# Patient Record
Sex: MALE
[De-identification: names, ages, dates, MRNs, and addresses within clinical notes are randomized per-mention and may not be internally consistent; named-entity substitution may affect disease eponyms.]

---

## 2023-02-08 PROBLEM — Z00.00 ENCOUNTER FOR PREVENTIVE HEALTH EXAMINATION: Status: ACTIVE | Noted: 2023-02-08

## 2023-02-09 ENCOUNTER — LABORATORY RESULT (OUTPATIENT)
Age: 68
End: 2023-02-09

## 2023-02-09 ENCOUNTER — APPOINTMENT (OUTPATIENT)
Dept: PULMONOLOGY | Facility: CLINIC | Age: 68
End: 2023-02-09
Payer: COMMERCIAL

## 2023-02-09 VITALS
HEIGHT: 65 IN | HEART RATE: 102 BPM | TEMPERATURE: 97.2 F | OXYGEN SATURATION: 97 % | WEIGHT: 168 LBS | BODY MASS INDEX: 27.99 KG/M2 | SYSTOLIC BLOOD PRESSURE: 124 MMHG | DIASTOLIC BLOOD PRESSURE: 80 MMHG

## 2023-02-09 DIAGNOSIS — J45.909 UNSPECIFIED ASTHMA, UNCOMPLICATED: ICD-10-CM

## 2023-02-09 DIAGNOSIS — R73.03 PREDIABETES.: ICD-10-CM

## 2023-02-09 PROCEDURE — 99204 OFFICE O/P NEW MOD 45 MIN: CPT | Mod: 25

## 2023-02-09 PROCEDURE — 71046 X-RAY EXAM CHEST 2 VIEWS: CPT

## 2023-02-09 PROCEDURE — 94727 GAS DIL/WSHOT DETER LNG VOL: CPT

## 2023-02-09 PROCEDURE — 95012 NITRIC OXIDE EXP GAS DETER: CPT

## 2023-02-09 PROCEDURE — 94060 EVALUATION OF WHEEZING: CPT

## 2023-02-09 PROCEDURE — 94729 DIFFUSING CAPACITY: CPT

## 2023-02-09 RX ORDER — ROSUVASTATIN CALCIUM 5 MG/1
TABLET, FILM COATED ORAL
Refills: 0 | Status: ACTIVE | COMMUNITY

## 2023-02-10 LAB
BASOPHILS # BLD AUTO: 0.06 K/UL
BASOPHILS NFR BLD AUTO: 0.7 %
EOSINOPHIL # BLD AUTO: 0.16 K/UL
EOSINOPHIL NFR BLD AUTO: 1.8 %
HCT VFR BLD CALC: 51.8 %
HGB BLD-MCNC: 16.9 G/DL
IMM GRANULOCYTES NFR BLD AUTO: 0.2 %
LYMPHOCYTES # BLD AUTO: 2.97 K/UL
LYMPHOCYTES NFR BLD AUTO: 33 %
MAN DIFF?: NORMAL
MCHC RBC-ENTMCNC: 28.2 PG
MCHC RBC-ENTMCNC: 32.6 GM/DL
MCV RBC AUTO: 86.3 FL
MONOCYTES # BLD AUTO: 0.63 K/UL
MONOCYTES NFR BLD AUTO: 7 %
NEUTROPHILS # BLD AUTO: 5.15 K/UL
NEUTROPHILS NFR BLD AUTO: 57.3 %
PLATELET # BLD AUTO: 274 K/UL
RBC # BLD: 6 M/UL
RBC # FLD: 14.2 %
WBC # FLD AUTO: 8.99 K/UL

## 2023-02-13 LAB
A ALTERNATA IGE QN: <0.1 KUA/L
A FUMIGATUS IGE QN: <0.1 KUA/L
C ALBICANS IGE QN: <0.1 KUA/L
C HERBARUM IGE QN: <0.1 KUA/L
CAT DANDER IGE QN: <0.1 KUA/L
COMMON RAGWEED IGE QN: <0.1 KUA/L
D FARINAE IGE QN: 0.83 KUA/L
D PTERONYSS IGE QN: 0.5 KUA/L
DEPRECATED A ALTERNATA IGE RAST QL: 0
DEPRECATED A FUMIGATUS IGE RAST QL: 0
DEPRECATED C ALBICANS IGE RAST QL: 0
DEPRECATED C HERBARUM IGE RAST QL: 0
DEPRECATED CAT DANDER IGE RAST QL: 0
DEPRECATED COMMON RAGWEED IGE RAST QL: 0
DEPRECATED D FARINAE IGE RAST QL: 2
DEPRECATED D PTERONYSS IGE RAST QL: 1
DEPRECATED DOG DANDER IGE RAST QL: 0
DEPRECATED M RACEMOSUS IGE RAST QL: 0
DEPRECATED ROACH IGE RAST QL: 2
DEPRECATED TIMOTHY IGE RAST QL: 0
DEPRECATED WHITE OAK IGE RAST QL: 0
DOG DANDER IGE QN: <0.1 KUA/L
M RACEMOSUS IGE QN: <0.1 KUA/L
ROACH IGE QN: 0.89 KUA/L
TIMOTHY IGE QN: <0.1 KUA/L
TOTAL IGE SMQN RAST: 98 KU/L
WHITE OAK IGE QN: <0.1 KUA/L

## 2023-02-13 NOTE — ASSESSMENT
[FreeTextEntry1] : Data reviewed:\par \par PA/lat CXR [facility] 2/2023 personally reviewed : clear, L pericardial fat pad\par \par PFT outside 2013: mild obstruction, FEV1 76%, no sig BD response / FENO 49\par PFT 02/09/2023 : mild fixed obstruction and rest normal / FENO 41\par \par Impression:\par Asthma w frequent exacerbations\par \par Plan:\par Check labs for phenotyping and consideration of biologics.\par Agree w planned allergy consult.\par Cont Advair and try to use bid.\par --\par Labs 2/2023: eos 160/ul, IgE 98 / spoke to him, hold off on biologics, see allergist and use Advair bid and we will follow up in 3 mos

## 2023-02-13 NOTE — CONSULT LETTER
[Dear  ___] : Dear  [unfilled], [( Thank you for referring [unfilled] for consultation for _____ )] : Thank you for referring [unfilled] for consultation for [unfilled] [Please see my note below.] : Please see my note below. [Consult Closing:] : Thank you very much for allowing me to participate in the care of this patient.  If you have any questions, please do not hesitate to contact me. [Sincerely,] : Sincerely, [FreeTextEntry2] : Jennifer Burton MD\par [address] \[place] [address]\par  [FreeTextEntry3] : Starr Maldonado MD, FCCP\par

## 2023-02-13 NOTE — HISTORY OF PRESENT ILLNESS
[TextBox_4] : 02/09/2023: Asked to evaluate patient by Dr Burton for asthma. Asthma since childhood by symptoms, not diagnosed until [place] came to [place] many years ago. Long term use of Advair -21 and Xopenex. Triggers are URI, smells and dust. Symptoms are much worse in [place] - was in [facility] in Jan and had to be seen at hospital. Symptoms overall getting worse. Here in [place] he rarely goes out, he works from home and rarely goes out due to cold weather and due to Covid. He takes Advair 1-2x a day. He uses Xopenex 4x a day if he goes out for sensation of congestion, dyspnea on exertion. Maybe 3 steroid bursts in past year - he treats himself with steroids he brings back from [place]. [place] usually every year. 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 He is vaccinated for Covid, 5 vaccines, and also had Covid. Never smoker. No pets. Born prematurely at home in [place].\par  [ESS] : 0

## 2023-05-10 ENCOUNTER — APPOINTMENT (OUTPATIENT)
Dept: PULMONOLOGY | Facility: CLINIC | Age: 68
End: 2023-05-10
Payer: COMMERCIAL

## 2023-05-10 VITALS
TEMPERATURE: 97.3 F | BODY MASS INDEX: 27.99 KG/M2 | WEIGHT: 168 LBS | HEART RATE: 98 BPM | HEIGHT: 65 IN | DIASTOLIC BLOOD PRESSURE: 70 MMHG | SYSTOLIC BLOOD PRESSURE: 130 MMHG | OXYGEN SATURATION: 97 %

## 2023-05-10 DIAGNOSIS — Z23 ENCOUNTER FOR IMMUNIZATION: ICD-10-CM

## 2023-05-10 PROCEDURE — 90677 PCV20 VACCINE IM: CPT

## 2023-05-10 PROCEDURE — 99213 OFFICE O/P EST LOW 20 MIN: CPT | Mod: 25

## 2023-05-10 PROCEDURE — G0009: CPT

## 2023-05-10 NOTE — HISTORY OF PRESENT ILLNESS
[TextBox_4] : 02/09/2023: Asked to evaluate patient by Dr Burton for asthma. Asthma since childhood by symptoms, not diagnosed until [place] came [place] ago. Long term use of Advair -21 and Xopenex. Triggers are URI, smells and dust. Symptoms are much worse in [place] - was in [place] in Jan and had to be seen at hospital. Symptoms overall getting worse. Here in [place] he rarely goes out, he works from home and rarely goes out due to cold weather and due to Covid. He takes Advair 1-2x a day. He uses Xopenex 4x a day if he goes out for sensation of congestion, dyspnea on exertion. Maybe 3 steroid bursts in past year - he treats himself with steroids he brings back from [place]. [place] usually every year. 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 He is vaccinated for Covid, 5 vaccines, and also had Covid. Never smoker. No pets. 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 He's great on this regimen. Less use of Xopenex, did much better in [place].\par

## 2023-05-10 NOTE — CONSULT LETTER
[Dear  ___] : Dear  [unfilled], [Courtesy Letter:] : I had the pleasure of seeing your patient, [unfilled], in my office today. [Please see my note below.] : Please see my note below. [Consult Closing:] : Thank you very much for allowing me to participate in the care of this patient.  If you have any questions, please do not hesitate to contact me. [Sincerely,] : Sincerely, [FreeTextEntry2] : Jennifer Burton MD\par [address] \[place] [address]\par  [FreeTextEntry3] : Starr Maldonado MD, FCCP\par

## 2023-05-10 NOTE — ASSESSMENT
[FreeTextEntry1] : Data reviewed:\par \par Labs 2/2023: eos 160/ul, IgE 98\par \par PA/lat CXR [facility] 2/2023 personally reviewed : clear, L pericardial fat pad\par \par PFT outside 2013: mild obstruction, FEV1 76%, no sig BD response / FENO 49\par PFT 02/09/2023 : mild fixed obstruction and rest normal / FENO 41\par \par Impression:\par Asthma w frequent exacerbations\par \par Plan:\par Doing well on Breztri + Dupixent.\par Discussed all of this.\par His care is being directed by Dr Arroyo, which is fine - I will remain available as an additional resource.\par [id number] given.